# Patient Record
Sex: MALE | Race: WHITE | NOT HISPANIC OR LATINO | URBAN - METROPOLITAN AREA
[De-identification: names, ages, dates, MRNs, and addresses within clinical notes are randomized per-mention and may not be internally consistent; named-entity substitution may affect disease eponyms.]

---

## 2021-03-31 ENCOUNTER — TELEPHONE ENCOUNTER (OUTPATIENT)
Dept: URBAN - METROPOLITAN AREA CLINIC 78 | Facility: CLINIC | Age: 51
End: 2021-03-31

## 2021-03-31 ENCOUNTER — OFFICE VISIT (OUTPATIENT)
Dept: URBAN - METROPOLITAN AREA CLINIC 78 | Facility: CLINIC | Age: 51
End: 2021-03-31
Payer: COMMERCIAL

## 2021-03-31 VITALS
HEIGHT: 65 IN | SYSTOLIC BLOOD PRESSURE: 133 MMHG | TEMPERATURE: 97.8 F | WEIGHT: 138.6 LBS | RESPIRATION RATE: 16 BRPM | DIASTOLIC BLOOD PRESSURE: 96 MMHG | BODY MASS INDEX: 23.09 KG/M2 | HEART RATE: 94 BPM

## 2021-03-31 DIAGNOSIS — R39.9 SYMPTOM INVOLVING BLADDER: ICD-10-CM

## 2021-03-31 DIAGNOSIS — R63.4 WEIGHT LOSS: ICD-10-CM

## 2021-03-31 DIAGNOSIS — F41.8 ANXIETY ABOUT HEALTH: ICD-10-CM

## 2021-03-31 DIAGNOSIS — R19.5 MUCUS IN STOOL: ICD-10-CM

## 2021-03-31 PROBLEM — 247808006: Status: ACTIVE | Noted: 2021-03-31

## 2021-03-31 PROBLEM — 105485001: Status: ACTIVE | Noted: 2021-03-31

## 2021-03-31 PROCEDURE — 99203 OFFICE O/P NEW LOW 30 MIN: CPT | Performed by: INTERNAL MEDICINE

## 2021-03-31 NOTE — HPI-TODAY'S VISIT:
Patient was referred by Cristopher CHAHAL A copy of this document will be sent to the physician.  Patient has no significant personal known medical conditions  Not on any medication right now  He is on allegies and is on several OTC meds  He is accompanied by his father    His question was regards to UTI ...and can he still have a colonoscopy  Has not had UTI ever    He reports weight loss .. ( lot of it coincinded with when mother got sick and was in hospice  and   , followed by his father getting hospitalized . He was the care giver )   Has changed way he eats . his BM are not as often as he would like too  Mucusy feeling  in rectum  He also reports "feeling like he has foul smelling urine " Denies  rectal bleeding  Denies abdominal pain  Denies diarrhea  Father endorses that son has significant anxiety  Went to ER  at [facility] on 2021  He often fatigue/ indigestion and admits to stress    He has questions about how to set up his work up  He has questions about role of colonoscopy  Labs reviewed : 3/12/2021 22:17 Sodium Level 139 mmol/L  Potassium Level 3.5 mmol/L  Chloride Level 102 mmol/L  Carbon Dioxide Level 29 mmol/L  AG 8 mmol/L  Osmolality, Calculated 278 mOsm/kg  Glucose 107 mg/dL  HI  Blood Urea Nitrogen 11 mg/dL  Creatinine 0.62 mg/dL  LOW  U:C 18 Ratio  Estimated GFR, Non African American 115 mL/min/1.73m2  Estimated GFR, African American 133 mL/min/1.73m2  Protein Total 6.6 gm/dL  Albumin Level 4.5 gm/dL  Bilirubin Total 0.7 mg/dL  Calcium Level Total 9.4 mg/dL  Alanine Aminotransferase 30 unit/L  Alkaline Phosphatase 16 unit/L  LOW  Aspartate Aminotransferase 24 unit/L  Lipase 36 unit/L  Thyroid Stimulating Hormone 1.41 mcIU/mL  White Blood Count 7.20 10E3/mcL  Red Blood Cell Count 5.10 10E6/mcL  Hemoglobin 16.3 gm/dL  HI  Hematocrit 47.4 %  HI  MCV 92.9 fL  HI  MCH 32.0 pg  MCHC 34.4 gm/dL  Red Cell Distribution Width-CV 12.1 %  Red Cell Distribution Width-SD 41.6 fL  Auto Nucleated Red Cell Count 0 %  NA  Platelet Count 247 10E3/mcL  Mean Platelet Volume 9.7 fL  Granulocyte 64 %  NA  ALYMPH 24 %  NA  AMONO 11 %  NA  AEOS 0 %  NA  ABASO 1.0 %  NA  Immature Granulocytes 0 %  NA  Absolute Neutrophils 4.65 10E3/mcL  Absolute Lymphocyte 1.69 10E3/mcL  Absolute Monocyte 0.79 10E3/mcL  HI  Absolute Eosinophils 0.01 10E3/mcL  LOW  Absolute Basophils 0.05 10E3/mcL  Absolute Immature Neut 0.01 10E3/mcL  Auto Nucleated Red Bood Cell, Absolute less than0.01 10E3/mcL  Color Yellow  Clarity Cloudy  Specific Gravity Urine 1.010  pH Urine Random 7.0  Protein Urine Qualitative Negative mg/dL  Glucose Urine Qualitative Negative mg/dL  Ketone Urine Qualitative 20 mg/dL  Blood Urine Qualitative Negative  Ascorbic Acid Urine Qualitative Positive  Urobilinogen Urine Qualitative less than2.0 mg/dL  Nitrite Urine Qualitative Negative  Leukocyte Esterase Urine Qual Negative  Bilirubin Urine Qualitative Negative mg/dL  WBC/HPF less than1 /HPF  RBC/HPF 4 /HPF  Mucus/LPF Few /HPF  Amorphous/HPF Many /HPF  Bacteria Few /HPF

## 2021-07-19 ENCOUNTER — TELEPHONE ENCOUNTER (OUTPATIENT)
Dept: URBAN - METROPOLITAN AREA CLINIC 35 | Facility: CLINIC | Age: 51
End: 2021-07-19

## 2022-09-28 ENCOUNTER — OFFICE VISIT (OUTPATIENT)
Dept: URBAN - METROPOLITAN AREA CLINIC 78 | Facility: CLINIC | Age: 52
End: 2022-09-28
Payer: COMMERCIAL

## 2022-09-28 ENCOUNTER — LAB OUTSIDE AN ENCOUNTER (OUTPATIENT)
Dept: URBAN - METROPOLITAN AREA CLINIC 78 | Facility: CLINIC | Age: 52
End: 2022-09-28

## 2022-09-28 ENCOUNTER — DASHBOARD ENCOUNTERS (OUTPATIENT)
Age: 52
End: 2022-09-28

## 2022-09-28 VITALS
SYSTOLIC BLOOD PRESSURE: 118 MMHG | BODY MASS INDEX: 30.16 KG/M2 | HEIGHT: 65 IN | HEART RATE: 64 BPM | WEIGHT: 181 LBS | TEMPERATURE: 97.8 F | DIASTOLIC BLOOD PRESSURE: 78 MMHG

## 2022-09-28 DIAGNOSIS — Z80.0 FAMILY HISTORY OF PANCREATIC CANCER: ICD-10-CM

## 2022-09-28 DIAGNOSIS — R14.0 BLOATING: ICD-10-CM

## 2022-09-28 DIAGNOSIS — Z12.11 COLON CANCER SCREENING: ICD-10-CM

## 2022-09-28 DIAGNOSIS — R12 HEARTBURN: ICD-10-CM

## 2022-09-28 PROCEDURE — 99214 OFFICE O/P EST MOD 30 MIN: CPT | Performed by: INTERNAL MEDICINE

## 2022-09-28 RX ORDER — SODIUM PICOSULFATE, MAGNESIUM OXIDE, AND ANHYDROUS CITRIC ACID 10; 3.5; 12 MG/160ML; G/160ML; G/160ML
160 ML LIQUID ORAL
Qty: 1 PACK | Refills: 0 | OUTPATIENT
Start: 2022-09-28 | End: 2022-09-29

## 2022-09-28 NOTE — HPI-TODAY'S VISIT:
The patient presents for a colon cancer screening.   Patient has never had a colonoscopy.   There is no family history of colon polyps or cancer.   Patient denies change in bowel habits, appetite and weight.  Patient denies bleeding per rectum.   Patient presents with complains of heartburn for the last few weeks Patient experiences the symptoms both during the day and night.  The symptoms are worsened by tomatoes Patient has no associated nausea / emesis / difficulty swallowing / change in voice / frequent sinusitis.

## 2022-10-18 ENCOUNTER — OFFICE VISIT (OUTPATIENT)
Dept: URBAN - METROPOLITAN AREA SURGERY CENTER 15 | Facility: SURGERY CENTER | Age: 52
End: 2022-10-18
Payer: COMMERCIAL

## 2022-10-18 DIAGNOSIS — K31.89 ACQUIRED DEFORMITY OF DUODENUM: ICD-10-CM

## 2022-10-18 DIAGNOSIS — K63.5 BENIGN COLON POLYP: ICD-10-CM

## 2022-10-18 DIAGNOSIS — R12 BURNING REFLUX: ICD-10-CM

## 2022-10-18 DIAGNOSIS — Z12.11 COLON CANCER SCREENING: ICD-10-CM

## 2022-10-18 PROCEDURE — 43239 EGD BIOPSY SINGLE/MULTIPLE: CPT | Performed by: INTERNAL MEDICINE

## 2022-10-18 PROCEDURE — 45385 COLONOSCOPY W/LESION REMOVAL: CPT | Performed by: INTERNAL MEDICINE

## 2022-10-18 PROCEDURE — G8907 PT DOC NO EVENTS ON DISCHARG: HCPCS | Performed by: INTERNAL MEDICINE
